# Patient Record
Sex: FEMALE | Race: BLACK OR AFRICAN AMERICAN | NOT HISPANIC OR LATINO | Employment: UNEMPLOYED | URBAN - METROPOLITAN AREA
[De-identification: names, ages, dates, MRNs, and addresses within clinical notes are randomized per-mention and may not be internally consistent; named-entity substitution may affect disease eponyms.]

---

## 2016-01-29 LAB — CRC RECOMMENDATION EXT: NORMAL

## 2018-10-30 LAB
CHOLEST SERPL-MSCNC: 244 MG/DL (ref 0–200)
HDLC SERPL-MCNC: 42 MG/DL (ref 35–70)
LDLC SERPL CALC-MCNC: 167 MG/DL (ref 0–160)
TRIGL SERPL-MCNC: 175 MG/DL (ref 40–160)

## 2019-04-09 LAB
CHOLEST SERPL-MSCNC: 261 MG/DL (ref 0–200)
HDLC SERPL-MCNC: 41 MG/DL (ref 35–70)
LDLC SERPL CALC-MCNC: 186 MG/DL (ref 0–160)
TRIGL SERPL-MCNC: 171 MG/DL (ref 40–160)

## 2019-05-07 LAB
LEFT EYE DM RETINOPATHY: POSITIVE
RIGHT EYE DM RETINOPATHY: POSITIVE

## 2020-09-18 PROBLEM — H25.813 COMBINED FORM OF AGE-RELATED CATARACT, BOTH EYES: Status: ACTIVE | Noted: 2020-09-18

## 2020-09-18 PROBLEM — E11.3393 MODERATE NONPROLIFERATIVE DIABETIC RETINOPATHY OF BOTH EYES WITHOUT MACULAR EDEMA ASSOCIATED WITH TYPE 2 DIABETES MELLITUS: Status: ACTIVE | Noted: 2020-09-18

## 2020-09-18 LAB
LEFT EYE DM RETINOPATHY: POSITIVE
RIGHT EYE DM RETINOPATHY: POSITIVE

## 2021-04-28 DIAGNOSIS — Z12.11 COLON CANCER SCREENING: ICD-10-CM

## 2021-07-13 ENCOUNTER — PATIENT OUTREACH (OUTPATIENT)
Dept: ADMINISTRATIVE | Facility: HOSPITAL | Age: 61
End: 2021-07-13

## 2021-07-13 DIAGNOSIS — Z12.11 ENCOUNTER FOR COLORECTAL CANCER SCREENING: Primary | ICD-10-CM

## 2021-07-13 DIAGNOSIS — Z12.12 ENCOUNTER FOR COLORECTAL CANCER SCREENING: Primary | ICD-10-CM

## 2021-08-17 LAB
LEFT EYE DM RETINOPATHY: POSITIVE
RIGHT EYE DM RETINOPATHY: POSITIVE

## 2021-11-02 PROBLEM — Z79.899 ON POTASSIUM WASTING DIURETIC THERAPY: Status: ACTIVE | Noted: 2021-01-01

## 2021-11-02 PROBLEM — E78.5 HYPERLIPIDEMIA: Chronic | Status: ACTIVE | Noted: 2021-01-01

## 2021-11-02 PROBLEM — M25.50 ARTHRALGIA: Status: ACTIVE | Noted: 2021-01-01

## 2021-11-02 PROBLEM — Z79.899 ON POTASSIUM WASTING DIURETIC THERAPY: Chronic | Status: ACTIVE | Noted: 2021-01-01

## 2021-11-02 PROBLEM — Z00.00 PREVENTATIVE HEALTH CARE: Status: ACTIVE | Noted: 2021-01-01

## 2021-11-02 PROBLEM — E11.9 TYPE 2 DIABETES MELLITUS WITHOUT COMPLICATION, WITHOUT LONG-TERM CURRENT USE OF INSULIN: Status: ACTIVE | Noted: 2021-01-01

## 2021-11-02 PROBLEM — M25.50 ARTHRALGIA: Chronic | Status: ACTIVE | Noted: 2021-01-01

## 2021-11-02 PROBLEM — I10 ESSENTIAL HYPERTENSION: Status: ACTIVE | Noted: 2021-01-01

## 2021-11-02 PROBLEM — I10 ESSENTIAL HYPERTENSION: Chronic | Status: ACTIVE | Noted: 2021-01-01

## 2021-11-02 PROBLEM — E11.9 TYPE 2 DIABETES MELLITUS WITHOUT COMPLICATION, WITHOUT LONG-TERM CURRENT USE OF INSULIN: Chronic | Status: ACTIVE | Noted: 2021-01-01

## 2021-11-02 PROBLEM — E78.5 HYPERLIPIDEMIA: Status: ACTIVE | Noted: 2021-01-01

## 2022-01-01 ENCOUNTER — PATIENT OUTREACH (OUTPATIENT)
Dept: ADMINISTRATIVE | Facility: HOSPITAL | Age: 62
End: 2022-01-01

## 2022-02-07 PROBLEM — Z00.00 PREVENTATIVE HEALTH CARE: Status: RESOLVED | Noted: 2021-01-01 | Resolved: 2022-01-01

## 2022-06-13 PROBLEM — K21.9 GASTROESOPHAGEAL REFLUX DISEASE WITHOUT ESOPHAGITIS: Status: ACTIVE | Noted: 2022-01-01

## 2022-06-13 PROBLEM — D64.9 ANEMIA: Status: ACTIVE | Noted: 2022-01-01

## 2022-06-13 PROBLEM — R11.0 NAUSEA: Status: ACTIVE | Noted: 2022-01-01

## 2022-06-13 PROBLEM — H53.9 VISION CHANGES: Status: ACTIVE | Noted: 2022-01-01

## 2022-06-13 PROBLEM — R94.31 ABNORMAL EKG: Status: ACTIVE | Noted: 2022-01-01

## 2022-07-12 PROBLEM — Z53.1 REFUSAL OF BLOOD TRANSFUSIONS AS PATIENT IS JEHOVAH'S WITNESS: Status: ACTIVE | Noted: 2022-01-01

## 2022-07-12 PROBLEM — D50.0 ANEMIA DUE TO CHRONIC BLOOD LOSS: Status: ACTIVE | Noted: 2022-01-01

## 2022-07-22 NOTE — PROGRESS NOTES
Health Maintenance Due   Topic Date Due    TETANUS VACCINE  Never done    Low Dose Statin  Never done    Shingles Vaccine (1 of 2) Never done    Pneumococcal Vaccines (Age 0-64) (2 - PCV) 10/25/2012    Eye Exam  08/17/2022   closed c-scope  Linked lipid panels, foot exams, and eye exams  Chart review  PCP appt 9/13/22  Pt checks BP weekly but didn't have reading today.  The patient is listed on the non-compliant report for need for statin therapy. The patient has an LDL>70 mg/dl. Please review diagnosis.

## 2022-08-09 PROBLEM — D63.8 ANEMIA OF CHRONIC DISEASE: Status: ACTIVE | Noted: 2022-01-01

## 2022-08-09 PROBLEM — D63.0 ANEMIA IN NEOPLASTIC DISEASE: Status: ACTIVE | Noted: 2022-01-01

## 2022-08-12 PROBLEM — K31.89 GASTRIC MASS: Status: ACTIVE | Noted: 2022-01-01

## 2022-08-18 PROBLEM — R07.9 CHEST PAIN: Status: ACTIVE | Noted: 2022-01-01

## 2022-09-13 PROBLEM — E79.0 ELEVATED URIC ACID IN BLOOD: Status: ACTIVE | Noted: 2022-01-01

## 2022-09-14 PROBLEM — R93.3 ABNORMAL FINDING ON GI TRACT IMAGING: Status: ACTIVE | Noted: 2022-01-01

## 2022-09-23 PROBLEM — N17.9 AKI (ACUTE KIDNEY INJURY): Status: ACTIVE | Noted: 2022-01-01

## 2022-09-23 PROBLEM — C49.A0 GIST (GASTROINTESTINAL STROMAL TUMOR), MALIGNANT: Status: ACTIVE | Noted: 2022-01-01

## 2022-09-23 PROBLEM — D72.829 LEUKOCYTOSIS: Status: ACTIVE | Noted: 2022-01-01

## 2022-09-23 PROBLEM — K92.2 UPPER GI BLEEDING: Status: ACTIVE | Noted: 2022-01-01

## 2022-09-25 PROBLEM — C49.A2 MALIGNANT GASTROINTESTINAL STROMAL TUMOR (GIST) OF STOMACH: Status: ACTIVE | Noted: 2022-01-01

## 2022-09-26 PROBLEM — C49.A0 GASTROINTESTINAL STROMAL TUMOR (GIST): Status: ACTIVE | Noted: 2022-01-01

## 2022-09-29 PROBLEM — N17.9 AKI (ACUTE KIDNEY INJURY): Status: RESOLVED | Noted: 2022-01-01 | Resolved: 2022-01-01

## 2022-09-29 PROBLEM — Z75.8 DISCHARGE PLANNING ISSUES: Status: ACTIVE | Noted: 2022-01-01

## 2022-10-04 PROBLEM — D72.829 LEUKOCYTOSIS: Status: RESOLVED | Noted: 2022-01-01 | Resolved: 2022-01-01

## 2022-10-05 PROBLEM — R11.0 NAUSEA: Status: RESOLVED | Noted: 2022-01-01 | Resolved: 2022-01-01

## 2022-10-05 PROBLEM — R07.9 CHEST PAIN: Status: RESOLVED | Noted: 2022-01-01 | Resolved: 2022-01-01

## 2022-10-11 PROBLEM — C49.A0 GASTROINTESTINAL STROMAL TUMOR (GIST): Status: RESOLVED | Noted: 2022-01-01 | Resolved: 2022-01-01

## 2022-10-11 PROBLEM — R29.6 UNWITNESSED FALL: Status: ACTIVE | Noted: 2022-01-01
